# Patient Record
Sex: MALE | Race: BLACK OR AFRICAN AMERICAN | Employment: FULL TIME | URBAN - METROPOLITAN AREA
[De-identification: names, ages, dates, MRNs, and addresses within clinical notes are randomized per-mention and may not be internally consistent; named-entity substitution may affect disease eponyms.]

---

## 2023-01-09 ENCOUNTER — HOSPITAL ENCOUNTER (EMERGENCY)
Age: 33
Discharge: HOME | End: 2023-01-09
Payer: COMMERCIAL

## 2023-01-09 VITALS
BODY MASS INDEX: 31.08 KG/M2 | WEIGHT: 250 LBS | HEIGHT: 75 IN | HEART RATE: 98 BPM | TEMPERATURE: 98.8 F | RESPIRATION RATE: 18 BRPM | SYSTOLIC BLOOD PRESSURE: 179 MMHG | DIASTOLIC BLOOD PRESSURE: 106 MMHG | OXYGEN SATURATION: 99 %

## 2023-01-09 DIAGNOSIS — S51.812A LACERATION OF LEFT FOREARM, INITIAL ENCOUNTER: ICD-10-CM

## 2023-01-09 DIAGNOSIS — T07.XXXA MULTIPLE LACERATIONS: Primary | ICD-10-CM

## 2023-01-09 PROCEDURE — 90471 IMMUNIZATION ADMIN: CPT | Performed by: PHYSICIAN ASSISTANT

## 2023-01-09 PROCEDURE — 99284 EMERGENCY DEPT VISIT MOD MDM: CPT

## 2023-01-09 PROCEDURE — 12002 RPR S/N/AX/GEN/TRNK2.6-7.5CM: CPT

## 2023-01-09 PROCEDURE — 6360000002 HC RX W HCPCS: Performed by: PHYSICIAN ASSISTANT

## 2023-01-09 PROCEDURE — 90714 TD VACC NO PRESV 7 YRS+ IM: CPT | Performed by: PHYSICIAN ASSISTANT

## 2023-01-09 RX ADMIN — CLOSTRIDIUM TETANI TOXOID ANTIGEN (FORMALDEHYDE INACTIVATED) AND CORYNEBACTERIUM DIPHTHERIAE TOXOID ANTIGEN (FORMALDEHYDE INACTIVATED) 0.5 ML: 5; 2 INJECTION, SUSPENSION INTRAMUSCULAR at 15:39

## 2023-01-09 ASSESSMENT — PAIN - FUNCTIONAL ASSESSMENT: PAIN_FUNCTIONAL_ASSESSMENT: 0-10

## 2023-01-09 ASSESSMENT — LIFESTYLE VARIABLES
HOW OFTEN DO YOU HAVE A DRINK CONTAINING ALCOHOL: NEVER
HOW MANY STANDARD DRINKS CONTAINING ALCOHOL DO YOU HAVE ON A TYPICAL DAY: PATIENT DOES NOT DRINK

## 2023-01-09 ASSESSMENT — PAIN SCALES - GENERAL: PAINLEVEL_OUTOF10: 8

## 2023-01-09 ASSESSMENT — PAIN DESCRIPTION - ORIENTATION: ORIENTATION: LEFT

## 2023-01-09 ASSESSMENT — PAIN DESCRIPTION - LOCATION: LOCATION: ARM

## 2023-01-09 NOTE — DISCHARGE INSTRUCTIONS
Follow-up with primary care doctor, urgent care or here in the emergency room to have sutures removed in the next 7 to 10 days. Your tetanus vaccine was updated today. Take Tylenol and ibuprofen for pain as needed    Return to emergency room if develop any redness, swelling, fevers, chills or signs of infection.
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
## 2024-03-08 ENCOUNTER — HOSPITAL ENCOUNTER (EMERGENCY)
Age: 34
Discharge: PSYCHIATRIC OR SUBSTANCE USE FACILITY | End: 2024-03-09
Attending: STUDENT IN AN ORGANIZED HEALTH CARE EDUCATION/TRAINING PROGRAM
Payer: COMMERCIAL

## 2024-03-08 DIAGNOSIS — F15.10 METHAMPHETAMINE ABUSE (HCC): ICD-10-CM

## 2024-03-08 DIAGNOSIS — R45.851 SUICIDAL IDEATION: Primary | ICD-10-CM

## 2024-03-08 DIAGNOSIS — R44.0 AUDITORY HALLUCINATION: ICD-10-CM

## 2024-03-08 LAB
ALBUMIN SERPL-MCNC: 5.1 G/DL (ref 3.4–5)
ALBUMIN/GLOB SERPL: 1.6 {RATIO} (ref 1.1–2.2)
ALP SERPL-CCNC: 74 U/L (ref 40–129)
ALT SERPL-CCNC: 22 U/L (ref 10–40)
AMPHETAMINES UR QL SCN>1000 NG/ML: POSITIVE
ANION GAP SERPL CALCULATED.3IONS-SCNC: 15 MMOL/L (ref 3–16)
APAP SERPL-MCNC: <5 UG/ML (ref 10–30)
AST SERPL-CCNC: 33 U/L (ref 15–37)
BARBITURATES UR QL SCN>200 NG/ML: ABNORMAL
BASOPHILS # BLD: 0 K/UL (ref 0–0.2)
BASOPHILS NFR BLD: 0.6 %
BENZODIAZ UR QL SCN>200 NG/ML: ABNORMAL
BILIRUB SERPL-MCNC: 0.4 MG/DL (ref 0–1)
BUN SERPL-MCNC: 15 MG/DL (ref 7–20)
CALCIUM SERPL-MCNC: 10.1 MG/DL (ref 8.3–10.6)
CANNABINOIDS UR QL SCN>50 NG/ML: POSITIVE
CHLORIDE SERPL-SCNC: 99 MMOL/L (ref 99–110)
CO2 SERPL-SCNC: 21 MMOL/L (ref 21–32)
COCAINE UR QL SCN: ABNORMAL
CREAT SERPL-MCNC: 1.5 MG/DL (ref 0.9–1.3)
DEPRECATED RDW RBC AUTO: 13.1 % (ref 12.4–15.4)
DRUG SCREEN COMMENT UR-IMP: ABNORMAL
EOSINOPHIL # BLD: 0.1 K/UL (ref 0–0.6)
EOSINOPHIL NFR BLD: 0.8 %
ETHANOLAMINE SERPL-MCNC: NORMAL MG/DL (ref 0–0.08)
FENTANYL SCREEN, URINE: ABNORMAL
FLUAV RNA RESP QL NAA+PROBE: NOT DETECTED
FLUBV RNA RESP QL NAA+PROBE: NOT DETECTED
GFR SERPLBLD CREATININE-BSD FMLA CKD-EPI: >60 ML/MIN/{1.73_M2}
GLUCOSE SERPL-MCNC: 151 MG/DL (ref 70–99)
HCT VFR BLD AUTO: 42 % (ref 40.5–52.5)
HGB BLD-MCNC: 14.5 G/DL (ref 13.5–17.5)
LYMPHOCYTES # BLD: 1.1 K/UL (ref 1–5.1)
LYMPHOCYTES NFR BLD: 13.9 %
MAGNESIUM SERPL-MCNC: 1.9 MG/DL (ref 1.8–2.4)
MCH RBC QN AUTO: 33.8 PG (ref 26–34)
MCHC RBC AUTO-ENTMCNC: 34.6 G/DL (ref 31–36)
MCV RBC AUTO: 97.7 FL (ref 80–100)
METHADONE UR QL SCN>300 NG/ML: ABNORMAL
MONOCYTES # BLD: 0.5 K/UL (ref 0–1.3)
MONOCYTES NFR BLD: 6.3 %
NEUTROPHILS # BLD: 6.1 K/UL (ref 1.7–7.7)
NEUTROPHILS NFR BLD: 78.4 %
OPIATES UR QL SCN>300 NG/ML: ABNORMAL
OXYCODONE UR QL SCN: ABNORMAL
PCP UR QL SCN>25 NG/ML: ABNORMAL
PH UR STRIP: 5 [PH]
PLATELET # BLD AUTO: 262 K/UL (ref 135–450)
PMV BLD AUTO: 8.6 FL (ref 5–10.5)
POTASSIUM SERPL-SCNC: 3.3 MMOL/L (ref 3.5–5.1)
PROT SERPL-MCNC: 8.3 G/DL (ref 6.4–8.2)
RBC # BLD AUTO: 4.3 M/UL (ref 4.2–5.9)
SALICYLATES SERPL-MCNC: <0.3 MG/DL (ref 15–30)
SARS-COV-2 RNA RESP QL NAA+PROBE: NOT DETECTED
SODIUM SERPL-SCNC: 135 MMOL/L (ref 136–145)
WBC # BLD AUTO: 7.7 K/UL (ref 4–11)

## 2024-03-08 PROCEDURE — 80307 DRUG TEST PRSMV CHEM ANLYZR: CPT

## 2024-03-08 PROCEDURE — 93005 ELECTROCARDIOGRAM TRACING: CPT | Performed by: STUDENT IN AN ORGANIZED HEALTH CARE EDUCATION/TRAINING PROGRAM

## 2024-03-08 PROCEDURE — 99285 EMERGENCY DEPT VISIT HI MDM: CPT

## 2024-03-08 PROCEDURE — 90791 PSYCH DIAGNOSTIC EVALUATION: CPT | Performed by: SOCIAL WORKER

## 2024-03-08 PROCEDURE — 85025 COMPLETE CBC W/AUTO DIFF WBC: CPT

## 2024-03-08 PROCEDURE — 83735 ASSAY OF MAGNESIUM: CPT

## 2024-03-08 PROCEDURE — 6370000000 HC RX 637 (ALT 250 FOR IP): Performed by: STUDENT IN AN ORGANIZED HEALTH CARE EDUCATION/TRAINING PROGRAM

## 2024-03-08 PROCEDURE — 80143 DRUG ASSAY ACETAMINOPHEN: CPT

## 2024-03-08 PROCEDURE — 87636 SARSCOV2 & INF A&B AMP PRB: CPT

## 2024-03-08 PROCEDURE — 80179 DRUG ASSAY SALICYLATE: CPT

## 2024-03-08 PROCEDURE — 80053 COMPREHEN METABOLIC PANEL: CPT

## 2024-03-08 PROCEDURE — 82077 ASSAY SPEC XCP UR&BREATH IA: CPT

## 2024-03-08 RX ORDER — POTASSIUM CHLORIDE 20 MEQ/1
40 TABLET, EXTENDED RELEASE ORAL ONCE
Status: COMPLETED | OUTPATIENT
Start: 2024-03-08 | End: 2024-03-08

## 2024-03-08 RX ORDER — QUETIAPINE FUMARATE 25 MG/1
25 TABLET, FILM COATED ORAL 2 TIMES DAILY
COMMUNITY

## 2024-03-08 RX ORDER — 0.9 % SODIUM CHLORIDE 0.9 %
1000 INTRAVENOUS SOLUTION INTRAVENOUS ONCE
Status: DISCONTINUED | OUTPATIENT
Start: 2024-03-08 | End: 2024-03-09 | Stop reason: HOSPADM

## 2024-03-08 RX ORDER — BUSPIRONE HYDROCHLORIDE 5 MG/1
5 TABLET ORAL 3 TIMES DAILY
COMMUNITY

## 2024-03-08 RX ADMIN — POTASSIUM CHLORIDE 40 MEQ: 1500 TABLET, EXTENDED RELEASE ORAL at 18:10

## 2024-03-08 ASSESSMENT — PAIN - FUNCTIONAL ASSESSMENT: PAIN_FUNCTIONAL_ASSESSMENT: NONE - DENIES PAIN

## 2024-03-08 ASSESSMENT — PAIN SCALES - GENERAL: PAINLEVEL_OUTOF10: 0

## 2024-03-08 NOTE — ED PROVIDER NOTES
ED Attending Attestation Note    This patient was seen by the advanced practice provider.     I personally saw the patient and made/approved the management plan and take responsibility for the patient management.    History:   Briefly, 33 y.o. male presents with suicide ideation has been worse as well as worsen depression with thoughts of harming self.  Also has been having auditory hallucinations with voices telling him to kill himself as well as to kill others      Physical Exam  Vitals and nursing note reviewed.   Constitutional:       General: He is not in acute distress.     Appearance: He is not ill-appearing or toxic-appearing.   HENT:      Head: Normocephalic and atraumatic.      Right Ear: External ear normal.      Left Ear: External ear normal.      Nose: Nose normal.   Eyes:      Conjunctiva/sclera: Conjunctivae normal.   Cardiovascular:      Rate and Rhythm: Regular rhythm. Tachycardia present.      Pulses: Normal pulses.      Heart sounds: Normal heart sounds. No murmur heard.     No friction rub. No gallop.      Comments: Radial pulses 2+ bilaterally, DP pulses 2+ bilaterally  Pulmonary:      Effort: Pulmonary effort is normal. No respiratory distress.      Breath sounds: No wheezing or rales.   Musculoskeletal:      Right lower leg: No edema.      Left lower leg: No edema.   Skin:     General: Skin is warm and dry.      Capillary Refill: Capillary refill takes less than 2 seconds.   Neurological:      Mental Status: He is alert.   Psychiatric:         Attention and Perception: He perceives auditory hallucinations.         Behavior: Behavior is not aggressive.         Thought Content: Thought content is paranoid. Thought content includes suicidal ideation. Thought content does not include homicidal ideation. Thought content includes suicidal plan. Thought content does not include homicidal plan.           ED Course as of 03/08/24 2339   Fri Mar 08, 2024   2197 Spoke with Camille from [email or web address] who is  recommending inpatient psych treatment at this time due to SI with plan and hallucinations.  [PE]      ED Course User Index  [PE] Victor Manuel Lopez PA           EKG interpreted by myself.   Rate: 94  Rhythm: Sinus rhythm  Axis: normal  Intervals: KS interval 120, QRS 94, QTc 437  ST Segments: No clinically significant ST segment elevation or depression, benign early repolarization in V2 V3  T waves: No significant T wave abnormality.  Comparison: No prior for comparison  Impression: Sinus with benign repolarization V2 V3        MDM:   This is a 33-year-old male coming with suicide ideation as well as methamphetamine abuse with hallucination of voices time to as well as kill others.  Mild hypokalemia which was repleted, mild MEREDITH he was given fluids for rehydration.  No other significant metabolic electrolyte derangements, no leukocytosis no anemia or thrombocytopenia, ethanol negative, acetaminophen level, magnesium level within normal limits, salicylate level negative.  Patient is medically cleared at this time for inpatient psychiatric admission.       For further details of the patient's emergency department visit, please see the advanced practice provider's documentation.    Brady Oden MD     This report has been produced using speech recognition software and may contain errors related to that system including errors in grammar, punctuation, and spelling, as well as words and phrases that may be inappropriate. If there are any questions or concerns please feel free to contact the dictating provider for clarification.          Brady Oden MD  03/08/24 6672

## 2024-03-08 NOTE — ED NOTES
Pt placed in a gown with strings removed, all belongings removed, Iftikhar from security inventoried and took belongings to security office.,   Room is safe for patient, all monitor wires removed, all other cords removed except call light.  Call light given to patient .  Door remains open for safety of patient and .

## 2024-03-08 NOTE — ED PROVIDER NOTES
[facility] EMERGENCY DEPARTMENT  EMERGENCY DEPARTMENT ENCOUNTER        Pt Name: Jerzy Galeana  MRN: [id number]  Birthdate 1990  Date of evaluation: 3/8/2024  Provider: IRVIN Rangel  PCP: No primary care provider on file.  Note Started: 4:37 PM EST 3/8/24       I have seen and evaluated this patient with my supervising physician Brady Oden, *.      CHIEF COMPLAINT       Chief Complaint   Patient presents with    Suicidal     States \"I want to kill myself and other people\" also states he wants to detox from meth, last use was this morning around 4-5AM. States he is looking for rehab to help him. \"If I keep doing drugs..\" states previous suicidal attempts by swallowing pills          HISTORY OF PRESENT ILLNESS: 1 or more Elements     History from : Patient    Limitations to history : None    Jerzy Galeana is a 33 y.o. male who presents to the emergency room due to suicidal and homicidal ideations present for the last month or so.  States that he relapsed over the last couple months doing meth.  Patient states that he lives at home alone and does not have any family members around.  Patient states that he has had a episode of suicidal attempt by taking a bunch of pills in the past.  States that he had to have his stomach pumped because of this.  Patient states that he would either take some pills or jump off a bridge to try to kill himself.  He also keeps saying that he hears voices from time to time.  He does not state that these voices tell him to hurt himself or hurt others.    Nursing Notes were all reviewed and agreed with or any disagreements were addressed in the HPI.    REVIEW OF SYSTEMS :      Review of Systems   Psychiatric/Behavioral:  Positive for self-injury and suicidal ideas.        Positives and Pertinent negatives as per HPI.     SURGICAL HISTORY   History reviewed. No pertinent surgical history.    CURRENTMEDICATIONS       Previous Medications    BUSPIRONE  158/72 (!) 158/86 (!) 158/86   Pulse: (!) 118  99   Resp: 19     Temp: 97.7 °F (36.5 °C)     TempSrc: Oral     SpO2: 100%  100%   Weight: 121.6 kg (268 lb 1.6 oz)         Patient was given the following medications:  Medications   sodium chloride 0.9 % bolus 1,000 mL (1,000 mLs IntraVENous Not Given 3/8/24 1816)   potassium chloride (KLOR-CON M) extended release tablet 40 mEq (40 mEq Oral Given 3/8/24 1810)       ED Course as of 03/08/24 2102   Fri Mar 08, 2024   1707 Spoke with Camille from [facility] who is recommending inpatient psych treatment at this time due to SI with plan and hallucinations.  [PE]      ED Course User Index  [PE] Victor Manuel Lopez PA        Is this patient to be included in the SEP-1 Core Measure due to severe sepsis or septic shock?   No   Exclusion criteria - the patient is NOT to be included for SEP-1 Core Measure due to:  Infection is not suspected    CONSULTS: (Who and What was discussed)  IP CONSULT TO TELE-PSYCH (SOCIAL WORK ONLY)  Discussion with Other Profesionals : None    Social Determinants : None    Records Reviewed : None    CC/HPI Summary, DDx, ED Course, and Reassessment: Briefly is a 33-year-old male who presents emergency room due to suicidal ideations as well as auditory hallucinations.  States that he has been on meth recently.  States he lives alone.  He states that he has plans of hurting himself by either jumping off of a bridge or taking much prescription medications.  He has no other acute medical complaints.    On exam he does not appear to be in any respiratory distress.  He does appear somewhat flat and only provide short answers.  Lungs are clear to auscultation heart has regular rate and rhythm.     Screening protocol was ordered due to his suicidal ideations and hallucinations.    Workup today with normal CBC negative for alcohol negative for acetaminophen level salicylate level.  CMP showing sodium 135 potassium 3.3 glucose 151 creatinine of 1.5.    COVID-19 and

## 2024-03-08 NOTE — VIRTUAL HEALTH
Jerzy Galeana  [id number]  1990     Social Work Behavioral Health Crisis Assessment    03/08/24    Chief Complaint: \" I want to jump off a bridge and kill myself\"    HPI: Patient is a 33 y.o. Black /  male who presents for SI. Patient presented to the ED on 03/08/24 from home.    Past Psychiatric History:  Previous Diagnoses/symptoms: Depression and anxiety   Previous suicide attempts/self-harm: patient reported that he tired to kill himself in the past   Inpatient psychiatric hospitalizations: no  Current outpatient psychiatric provider: Denies  Current therapist: States not in therapy  Previous psychiatric medication trials: No prior medication trials  Current psychiatric medications: hasn't been taking meds   Family Psychiatric History: Denies    Sleep Hours: 3-4 hours    Sleep concerns: difficulty attaining sleep    Use of sleep medications: denies    Substance Abuse History:  Tobacco: Denies  Alcohol: Denies  Marijuana: Endorses reported last use today  Stimulant: Endorses Patient history of methamphetamine last used today, and history using crack cocaine,    Opiates: Denies  Benzodiazepine: Denies  Other illicit drug usage: Denies  History of substance/alcohol abuse treatment: Denies    Social History:  Education: H.S.  Living Situation/Interest: alone  Marital/Committed relationship and parenting hx: single  Occupation: Unemployed  Legal History/Hx of Violence: Denies  Spiritual History: Worship   Psychological trauma, neglect, or abuse: denies hx of trauma/abuse   Access to guns or other weapons: denies having access to firearms/dangerous weapons     Past Medical History:  Active Ambulatory Problems     Diagnosis Date Noted    No Active Ambulatory Problems     Resolved Ambulatory Problems     Diagnosis Date Noted    No Resolved Ambulatory Problems     Past Medical History:   Diagnosis Date    Seizures (HCC)      Allergies:  No Known Allergies   Medications:  No current

## 2024-03-09 VITALS
TEMPERATURE: 97.7 F | BODY MASS INDEX: 33.33 KG/M2 | HEART RATE: 84 BPM | WEIGHT: 268.08 LBS | OXYGEN SATURATION: 100 % | HEIGHT: 75 IN | DIASTOLIC BLOOD PRESSURE: 82 MMHG | SYSTOLIC BLOOD PRESSURE: 140 MMHG | RESPIRATION RATE: 19 BRPM

## 2024-03-09 LAB
EKG ATRIAL RATE: 94 BPM
EKG DIAGNOSIS: NORMAL
EKG P AXIS: 69 DEGREES
EKG P-R INTERVAL: 120 MS
EKG Q-T INTERVAL: 350 MS
EKG QRS DURATION: 94 MS
EKG QTC CALCULATION (BAZETT): 437 MS
EKG R AXIS: 46 DEGREES
EKG T AXIS: 25 DEGREES
EKG VENTRICULAR RATE: 94 BPM

## 2024-03-09 PROCEDURE — 93010 ELECTROCARDIOGRAM REPORT: CPT | Performed by: INTERNAL MEDICINE

## 2024-03-09 ASSESSMENT — PAIN SCALES - GENERAL: PAINLEVEL_OUTOF10: 0

## 2024-03-09 NOTE — ED NOTES
This RN has assumed care of pt. Report received from Sosa HAWTHORNE. Sitter remains in place. Room door open. Room remains safe. Belongings remain removed from room.

## 2024-03-09 NOTE — ED NOTES
Pt resting in bed with eyes closed. Chest rising equally with respirations. Room remains safe. Sitter remains in place. Belongings remain outside of room. Pt expresses no further needs at this time.

## 2024-03-09 NOTE — ED NOTES
Pt resting in bed with eyes open. Room remains safe. Sitter remains in place. Belongings remain outside of room. Pt expresses no further needs at this time.

## 2024-03-09 NOTE — ED NOTES
Labs scheduled on 4/24/23.   Pt resting in bed with eyes open. Room remains safe. Sitter remains in place. Belongings remain outside of room. Pt expresses no further needs at this time.

## 2024-03-09 NOTE — ED NOTES
Pt sleeping in bed peacefully. Chest rising equally with respirations. Room remains safe. Sitter remains in place. Belongings remain outside of room.

## 2024-04-08 ENCOUNTER — HOSPITAL ENCOUNTER (EMERGENCY)
Age: 34
Discharge: PSYCHIATRIC OR SUBSTANCE USE FACILITY | End: 2024-04-09
Attending: EMERGENCY MEDICINE
Payer: COMMERCIAL

## 2024-04-08 DIAGNOSIS — T07.XXXA MULTIPLE LACERATIONS: ICD-10-CM
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
## 2024-04-08 LAB
ACETAMINOPHEN LEVEL: <5 UG/ML (ref 15–30)
ALBUMIN SERPL-MCNC: 4.5 GM/DL (ref 3.4–5)
ALCOHOL SCREEN SERUM: <0.01 %WT/VOL
ALP BLD-CCNC: 81 IU/L (ref 40–128)
ALT SERPL-CCNC: 30 U/L (ref 10–40)
AMPHETAMINES: NEGATIVE
ANION GAP SERPL CALCULATED.3IONS-SCNC: 11 MMOL/L (ref 7–16)
AST SERPL-CCNC: 22 IU/L (ref 15–37)
BARBITURATE SCREEN URINE: NEGATIVE
BASOPHILS ABSOLUTE: 0.1 K/CU MM
BASOPHILS RELATIVE PERCENT: 0.9 % (ref 0–1)
BENZODIAZEPINE SCREEN, URINE: NEGATIVE
BILIRUB SERPL-MCNC: 0.2 MG/DL (ref 0–1)
BUN SERPL-MCNC: 9 MG/DL (ref 6–23)
CALCIUM SERPL-MCNC: 9.1 MG/DL (ref 8.3–10.6)
CANNABINOID SCREEN URINE: ABNORMAL
CHLORIDE BLD-SCNC: 103 MMOL/L (ref 99–110)
CO2: 26 MMOL/L (ref 21–32)
COCAINE METABOLITE: NEGATIVE
CREAT SERPL-MCNC: 1.1 MG/DL (ref 0.9–1.3)
DIFFERENTIAL TYPE: ABNORMAL
DOSE AMOUNT: ABNORMAL
DOSE AMOUNT: ABNORMAL
DOSE TIME: ABNORMAL
DOSE TIME: ABNORMAL
EOSINOPHILS ABSOLUTE: 0.1 K/CU MM
EOSINOPHILS RELATIVE PERCENT: 1.3 % (ref 0–3)
FENTANYL URINE: NEGATIVE
GFR SERPL CREATININE-BSD FRML MDRD: >90 ML/MIN/1.73M2
GLUCOSE SERPL-MCNC: 155 MG/DL (ref 70–99)
HCT VFR BLD CALC: 40.5 % (ref 42–52)
HEMOGLOBIN: 13.1 GM/DL (ref 13.5–18)
IMMATURE NEUTROPHIL %: 0.4 % (ref 0–0.43)
LYMPHOCYTES ABSOLUTE: 1.8 K/CU MM
LYMPHOCYTES RELATIVE PERCENT: 32.5 % (ref 24–44)
MCH RBC QN AUTO: 32.8 PG (ref 27–31)
MCHC RBC AUTO-ENTMCNC: 32.3 % (ref 32–36)
MCV RBC AUTO: 101.5 FL (ref 78–100)
MONOCYTES ABSOLUTE: 0.4 K/CU MM
MONOCYTES RELATIVE PERCENT: 7.7 % (ref 0–4)
NUCLEATED RBC %: 0 %
OPIATES, URINE: NEGATIVE
OXYCODONE: NEGATIVE
PDW BLD-RTO: 11.9 % (ref 11.7–14.9)
PLATELET # BLD: 323 K/CU MM (ref 140–440)
PMV BLD AUTO: 10.2 FL (ref 7.5–11.1)
POTASSIUM SERPL-SCNC: 4.4 MMOL/L (ref 3.5–5.1)
RBC # BLD: 3.99 M/CU MM (ref 4.6–6.2)
SALICYLATE LEVEL: <0.3 MG/DL (ref 15–30)
SEGMENTED NEUTROPHILS ABSOLUTE COUNT: 3.1 K/CU MM
SEGMENTED NEUTROPHILS RELATIVE PERCENT: 57.2 % (ref 36–66)
SODIUM BLD-SCNC: 140 MMOL/L (ref 135–145)
TOTAL IMMATURE NEUTOROPHIL: 0.02 K/CU MM
TOTAL NUCLEATED RBC: 0 K/CU MM
TOTAL PROTEIN: 7.3 GM/DL (ref 6.4–8.2)
WBC # BLD: 5.4 K/CU MM (ref 4–10.5)

## 2024-04-08 PROCEDURE — 83036 HEMOGLOBIN GLYCOSYLATED A1C: CPT

## 2024-04-08 PROCEDURE — G0480 DRUG TEST DEF 1-7 CLASSES: HCPCS

## 2024-04-08 PROCEDURE — 80307 DRUG TEST PRSMV CHEM ANLYZR: CPT

## 2024-04-08 PROCEDURE — 6360000002 HC RX W HCPCS: Performed by: EMERGENCY MEDICINE

## 2024-04-08 PROCEDURE — 90715 TDAP VACCINE 7 YRS/> IM: CPT | Performed by: EMERGENCY MEDICINE

## 2024-04-08 PROCEDURE — 85025 COMPLETE CBC W/AUTO DIFF WBC: CPT

## 2024-04-08 PROCEDURE — 90471 IMMUNIZATION ADMIN: CPT | Performed by: EMERGENCY MEDICINE

## 2024-04-08 PROCEDURE — 12002 RPR S/N/AX/GEN/TRNK2.6-7.5CM: CPT

## 2024-04-08 PROCEDURE — 90791 PSYCH DIAGNOSTIC EVALUATION: CPT | Performed by: SOCIAL WORKER

## 2024-04-08 PROCEDURE — 80053 COMPREHEN METABOLIC PANEL: CPT

## 2024-04-08 PROCEDURE — 2500000003 HC RX 250 WO HCPCS: Performed by: EMERGENCY MEDICINE

## 2024-04-08 PROCEDURE — 99285 EMERGENCY DEPT VISIT HI MDM: CPT

## 2024-04-08 PROCEDURE — 96372 THER/PROPH/DIAG INJ SC/IM: CPT

## 2024-04-08 RX ORDER — LIDOCAINE HYDROCHLORIDE 10 MG/ML
20 INJECTION, SOLUTION INFILTRATION; PERINEURAL ONCE
Status: COMPLETED | OUTPATIENT
Start: 2024-04-08 | End: 2024-04-08

## 2024-04-08 RX ORDER — HALOPERIDOL 5 MG/ML
5 INJECTION INTRAMUSCULAR ONCE
Status: COMPLETED | OUTPATIENT
Start: 2024-04-08 | End: 2024-04-08

## 2024-04-08 RX ORDER — DIPHENHYDRAMINE HYDROCHLORIDE 50 MG/ML
25 INJECTION INTRAMUSCULAR; INTRAVENOUS ONCE
Status: DISCONTINUED | OUTPATIENT
Start: 2024-04-08 | End: 2024-04-08

## 2024-04-08 RX ORDER — LORAZEPAM 2 MG/ML
2 INJECTION INTRAMUSCULAR ONCE
Status: DISCONTINUED | OUTPATIENT
Start: 2024-04-08 | End: 2024-04-08

## 2024-04-08 RX ORDER — LORAZEPAM 2 MG/ML
2 INJECTION INTRAMUSCULAR ONCE
Status: COMPLETED | OUTPATIENT
Start: 2024-04-08 | End: 2024-04-08

## 2024-04-08 RX ORDER — DIPHENHYDRAMINE HYDROCHLORIDE 50 MG/ML
25 INJECTION INTRAMUSCULAR; INTRAVENOUS ONCE
Status: COMPLETED | OUTPATIENT
Start: 2024-04-08 | End: 2024-04-08

## 2024-04-08 RX ADMIN — LORAZEPAM 2 MG: 2 INJECTION INTRAMUSCULAR; INTRAVENOUS at 17:00

## 2024-04-08 RX ADMIN — LIDOCAINE HYDROCHLORIDE 20 ML: 10 INJECTION, SOLUTION INFILTRATION; PERINEURAL at 18:29

## 2024-04-08 RX ADMIN — HALOPERIDOL LACTATE 5 MG: 5 INJECTION, SOLUTION INTRAMUSCULAR at 17:00

## 2024-04-08 RX ADMIN — TETANUS TOXOID, REDUCED DIPHTHERIA TOXOID AND ACELLULAR PERTUSSIS VACCINE, ADSORBED 0.5 ML: 5; 2.5; 8; 8; 2.5 SUSPENSION INTRAMUSCULAR at 17:42

## 2024-04-08 RX ADMIN — DIPHENHYDRAMINE HYDROCHLORIDE 25 MG: 50 INJECTION, SOLUTION INTRAMUSCULAR; INTRAVENOUS at 18:18
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
## 2024-04-08 NOTE — ED PROVIDER NOTES
Emergency Department Encounter    Patient: Jerzy Galeana  MRN: [id number]  : 1990  Date of Evaluation: 2024  ED Provider:  Nash West MD    Triage Chief Complaint:   Suicide Attempt (Several lacerations to bilateral wrists and forearms. Barricaded self in room at [facility] and attempted suicide, door needed to be broke down to get to patient. Bleeding controlled on arrival but patient refuses to talk, just continuously states \"I'm fine\".)    [place]:  Jerzy Galeana is a 33 y.o. male history of major depressive disorder as well as cannabis use that presents emergency department with the police department from the local chemical dependency treatment center, [facility], concerning her multiple self-inflicted lacerations in both forearms.  It is reported that at the facility patients were taken out outside received a collapsed but patient barricaded himself in the room and was somehow able to obtain a razor blade and cut himself with it.  He stated the door had to be broken to get him out of there.  However, patient is currently denying having suicidal or homicidal thoughts.  When I ask him why he did this he replies \"I do not know.\"  He is not making any eye contact and looking down all the time.  He denies any illicit or drug use in the recent past    ROS - see HPI, below listed is current ROS at time of my eval:  General:  No fevers, no chills, no weakness  Eyes:  No recent vison changes, no discharge  ENT:  No sore throat, no nasal congestion, no hearing changes  Cardiovascular:  No chest pain, no palpitations  Respiratory:  No shortness of breath, no cough, no wheezing  Gastrointestinal:  No pain, no nausea, no vomiting, no diarrhea  Musculoskeletal:  No muscle pain, no joint pain  Skin:  No rash, no pruritis, no easy bruising  Neurologic:  No speech problems, no headache, no extremity numbness, no extremity tingling, no extremity weakness  Psychiatric: Presumed suicidal attempt with multiple

## 2024-04-08 NOTE — VIRTUAL HEALTH
HPI: Patient is a 33 y.o. Black /  male who presents for suicide attempted by cutting both wrists. Patient presented to the ED on 04/08/24 from [facility].     Recorded, Pt brought in by EMS and SPD, pink slipped. Pt from [facility]. Per EMS, pt barricaded himself in his room at [facility] and broke open shaving razor and slit both of his wrists. Pt is unwilling to say what happened and is answering this RN's questions all with \"no\".     YASIRW discussed consult with  staff onsite, Gissell.   Reported that at this time pt is not will to cooperate and is getting stitched. If pt still is unwilling to participate will have to send in new order for assessment to be completed after 6:30pm.    At this time order will be cancelled and re-consulted when needed.    Reason for Cancel: TelePsych Reason for Cancel:  staff onsite       Total time spent on this encounter: Not billed by time    --Lilly Brannon LCSW on 4/8/2024 at 4:58 PM    An electronic signature was used to authenticate this note.

## 2024-04-08 NOTE — ED NOTES
Patient continues to deny SI at this time. States \"I'm fine\". Patient does report hx of meth abuse and states that he has been clean for 30 days. This nurse asked patient if he has been hearing voices and patient continues to state, \"no\". Also states \"no\" to HI.

## 2024-04-08 NOTE — ED NOTES
Rani ARCINIEGA, called and spoke with this nurse and states that patient barricaded himself in room and pushed bed in front of door and door had to be broken down in order to get to patient. DEMIAN states that patient was yelling that he didn't want to live anymore and that he just wanted to die.

## 2024-04-08 NOTE — ED NOTES
Transfer Center Checklist for Behavioral Health Transfers      Currently in Restraints Now or During this Encounter: No  (Specify if Agitation or self harm is noted in ED?)            Medical Clearance Documented and Verified in the Chart: Yes    LABS  Labs Resulted (see below, if applicable): Yes  Are Any of the Labs Abnormal: Yes: Comment: HGB RBC Hematocrit     CBC:   Lab Results   Component Value Date/Time    WBC 5.4 04/08/2024 06:35 PM    RBC 3.99 04/08/2024 06:35 PM    HGB 13.1 04/08/2024 06:35 PM    HCT 40.5 04/08/2024 06:35 PM    .5 04/08/2024 06:35 PM    MCH 32.8 04/08/2024 06:35 PM    MCHC 32.3 04/08/2024 06:35 PM    RDW 11.9 04/08/2024 06:35 PM     04/08/2024 06:35 PM    MPV 10.2 04/08/2024 06:35 PM     CMP:   Lab Results   Component Value Date/Time     04/08/2024 06:35 PM    K 4.4 04/08/2024 06:35 PM    K 3.3 03/08/2024 04:30 PM     04/08/2024 06:35 PM    CO2 26 04/08/2024 06:35 PM    BUN 9 04/08/2024 06:35 PM    CREATININE 1.1 04/08/2024 06:35 PM    AGRATIO 1.6 03/08/2024 04:30 PM    LABGLOM >90 04/08/2024 06:35 PM    GLUCOSE 155 04/08/2024 06:35 PM    PROT 7.3 04/08/2024 06:35 PM    LABALBU 4.5 04/08/2024 06:35 PM    CALCIUM 9.1 04/08/2024 06:35 PM    BILITOT 0.2 04/08/2024 06:35 PM    ALKPHOS 81 04/08/2024 06:35 PM    AST 22 04/08/2024 06:35 PM    ALT 30 04/08/2024 06:35 PM     Drug Panel: No results found for: \"AMPHETAMUR\", \"BARBITURATUR\", \"BENZODIAZURI\", \"COCAINEUR\", \"METHADU\", \"OPIAU\", \"PCPURINE\", \"THCUR\", \"LABCOMM\"  UA:No results found for: \"COLORU\", \"APPEARANCE\", \"LABPH\", \"PROTEINU\", \"GLUCOSEU\", \"KETUA\", \"BILIRUBINUR\", \"BLOODU\", \"UROBILINOGEN\", \"NITRU\", \"LEUKOCYTESUR\", \"WBCUA\", \"RBCUA\", \"EPITHUA\", \"BACTERIA\"  PREGNANCY TEST: No results found for: \"PREGTESTUR\"    Patient's Current Location: [facility] EMERGENCY DEPARTMENT     Chief Complaint   Patient presents with    Suicide Attempt     Several lacerations to bilateral wrists and

## 2024-04-08 NOTE — VIRTUAL HEALTH
Jerzy Galeana  [id number]  1990     Social Work Behavioral Health Crisis Assessment    04/08/24    Chief Complaint: Suicide attempt    HPI: Patient is a 33 y.o. Black /  male who presents for Suicide attempt. Patient presented to the ED on 04/08/24 from [facility].    Recorded, Pt brought in by EMS and SPD, pink slipped. Pt from [facility]. Per EMS, pt barricaded himself in his room at [facility] and broke open shaving razor and slit both of his wrists. Pt is unwilling to say what happened and is answering this RN's questions all with \"no\".      Pt reported, \"nothing really to talk about. Just depressed, just did it. I was in my bed thinking about stuff, and I just deserved to do that and be here. So I pushed my bed in front of the door, wrote my mom a letter, and went to the bathroom and I did it\".    Past Psychiatric History:  Previous Diagnoses/symptoms: anxiety and depression.  Previous suicide attempts/self-harm: History of suicide attempt and history of self-harm.   Inpatient psychiatric hospitalizations: yes \"history reports it was cancelled\"  Current outpatient psychiatric provider: [facility]  Current therapist: Unsure  Previous psychiatric medication trials: \"Yes different ones for different things\"  Current psychiatric medications: Yes  Family Psychiatric History: Denies    Sleep Hours: 2 hours    Sleep concerns:  \"it has been rough, like I take my medications, and I can't sleep. I will lucid dream, and just wake up exhausted\".     Use of sleep medications:  \"I take trazodone, and nothing has been helping\"    Substance Abuse History:  Tobacco: Denies  Alcohol: Denies  Marijuana: Denies  Stimulant:  Denies  Opiates: Denies  Benzodiazepine: Denies  Other illicit drug usage:  \"Meth last month on the 7th\"  History of substance/alcohol abuse treatment: Yes    Social History:  Education: H.S.  Living Situation/Interest: alone  Marital/Committed relationship and

## 2024-04-09 VITALS
RESPIRATION RATE: 16 BRPM | BODY MASS INDEX: 33.07 KG/M2 | SYSTOLIC BLOOD PRESSURE: 158 MMHG | HEIGHT: 75 IN | DIASTOLIC BLOOD PRESSURE: 87 MMHG | HEART RATE: 97 BPM | TEMPERATURE: 99.2 F | OXYGEN SATURATION: 100 % | WEIGHT: 266 LBS

## 2024-04-10 LAB
ESTIMATED AVERAGE GLUCOSE: 88 MG/DL
HBA1C MFR BLD: 4.7 % (ref 4.2–6.3)

## 2024-04-15 ENCOUNTER — CLINICAL DOCUMENTATION (OUTPATIENT)
Dept: INTERNAL MEDICINE CLINIC | Age: 34
End: 2024-04-15

## 2024-04-26 ENCOUNTER — HOSPITAL ENCOUNTER (EMERGENCY)
Age: 34
Discharge: PSYCHIATRIC OR SUBSTANCE USE FACILITY | End: 2024-04-26
Attending: EMERGENCY MEDICINE
Payer: COMMERCIAL

## 2024-04-26 VITALS
TEMPERATURE: 98.3 F | RESPIRATION RATE: 15 BRPM | HEART RATE: 105 BPM | DIASTOLIC BLOOD PRESSURE: 76 MMHG | SYSTOLIC BLOOD PRESSURE: 130 MMHG | OXYGEN SATURATION: 99 %

## 2024-04-26 DIAGNOSIS — F32.A DEPRESSION WITH SUICIDAL IDEATION: Primary | ICD-10-CM

## 2024-04-26 DIAGNOSIS — F19.10 POLYSUBSTANCE ABUSE (HCC): ICD-10-CM

## 2024-04-26 DIAGNOSIS — R45.851 DEPRESSION WITH SUICIDAL IDEATION: Primary | ICD-10-CM

## 2024-04-26 LAB
ACETAMINOPHEN LEVEL: <5 UG/ML (ref 15–30)
ALBUMIN SERPL-MCNC: 4.3 GM/DL (ref 3.4–5)
ALCOHOL SCREEN SERUM: <0.01 %WT/VOL
ALP BLD-CCNC: 66 IU/L (ref 40–128)
ALT SERPL-CCNC: 22 U/L (ref 10–40)
AMPHETAMINES: ABNORMAL
ANION GAP SERPL CALCULATED.3IONS-SCNC: 13 MMOL/L (ref 7–16)
AST SERPL-CCNC: 17 IU/L (ref 15–37)
BARBITURATE SCREEN URINE: NEGATIVE
BASOPHILS ABSOLUTE: 0.1 K/CU MM
BASOPHILS RELATIVE PERCENT: 1.3 % (ref 0–1)
BENZODIAZEPINE SCREEN, URINE: NEGATIVE
BILIRUB SERPL-MCNC: 0.2 MG/DL (ref 0–1)
BUN SERPL-MCNC: 16 MG/DL (ref 6–23)
CALCIUM SERPL-MCNC: 9.1 MG/DL (ref 8.3–10.6)
CANNABINOID SCREEN URINE: ABNORMAL
CHLORIDE BLD-SCNC: 101 MMOL/L (ref 99–110)
CO2: 22 MMOL/L (ref 21–32)
COCAINE METABOLITE: NEGATIVE
CREAT SERPL-MCNC: 1 MG/DL (ref 0.9–1.3)
DIFFERENTIAL TYPE: ABNORMAL
DOSE AMOUNT: ABNORMAL
DOSE AMOUNT: ABNORMAL
DOSE TIME: ABNORMAL
DOSE TIME: ABNORMAL
EOSINOPHILS ABSOLUTE: 0.2 K/CU MM
EOSINOPHILS RELATIVE PERCENT: 3 % (ref 0–3)
FENTANYL URINE: NEGATIVE
GFR SERPL CREATININE-BSD FRML MDRD: >90 ML/MIN/1.73M2
GLUCOSE SERPL-MCNC: 144 MG/DL (ref 70–99)
HCT VFR BLD CALC: 39.3 % (ref 42–52)
HEMOGLOBIN: 13.3 GM/DL (ref 13.5–18)
IMMATURE NEUTROPHIL %: 0.2 % (ref 0–0.43)
LYMPHOCYTES ABSOLUTE: 2.1 K/CU MM
LYMPHOCYTES RELATIVE PERCENT: 38.8 % (ref 24–44)
MCH RBC QN AUTO: 33.1 PG (ref 27–31)
MCHC RBC AUTO-ENTMCNC: 33.8 % (ref 32–36)
MCV RBC AUTO: 97.8 FL (ref 78–100)
MONOCYTES ABSOLUTE: 0.6 K/CU MM
MONOCYTES RELATIVE PERCENT: 10.5 % (ref 0–4)
NEUTROPHILS RELATIVE PERCENT: 46.2 % (ref 36–66)
NUCLEATED RBC %: 0 %
OPIATES, URINE: NEGATIVE
OXYCODONE: NEGATIVE
PDW BLD-RTO: 11.9 % (ref 11.7–14.9)
PLATELET # BLD: 271 K/CU MM (ref 140–440)
PMV BLD AUTO: 9.9 FL (ref 7.5–11.1)
POTASSIUM SERPL-SCNC: 4 MMOL/L (ref 3.5–5.1)
RBC # BLD: 4.02 M/CU MM (ref 4.6–6.2)
SALICYLATE LEVEL: <0.3 MG/DL (ref 15–30)
SEGMENTED NEUTROPHILS ABSOLUTE COUNT: 2.5 K/CU MM
SODIUM BLD-SCNC: 136 MMOL/L (ref 135–145)
TOTAL IMMATURE NEUTOROPHIL: 0.01 K/CU MM
TOTAL NUCLEATED RBC: 0 K/CU MM
TOTAL PROTEIN: 7.7 GM/DL (ref 6.4–8.2)
WBC # BLD: 5.4 K/CU MM (ref 4–10.5)

## 2024-04-26 PROCEDURE — 80307 DRUG TEST PRSMV CHEM ANLYZR: CPT

## 2024-04-26 PROCEDURE — G0480 DRUG TEST DEF 1-7 CLASSES: HCPCS

## 2024-04-26 PROCEDURE — 99285 EMERGENCY DEPT VISIT HI MDM: CPT

## 2024-04-26 PROCEDURE — 6370000000 HC RX 637 (ALT 250 FOR IP): Performed by: NURSE PRACTITIONER

## 2024-04-26 PROCEDURE — 85025 COMPLETE CBC W/AUTO DIFF WBC: CPT

## 2024-04-26 PROCEDURE — 6370000000 HC RX 637 (ALT 250 FOR IP): Performed by: EMERGENCY MEDICINE

## 2024-04-26 PROCEDURE — 80053 COMPREHEN METABOLIC PANEL: CPT

## 2024-04-26 RX ORDER — HYDROXYZINE PAMOATE 25 MG/1
50 CAPSULE ORAL ONCE
Status: COMPLETED | OUTPATIENT
Start: 2024-04-26 | End: 2024-04-26

## 2024-04-26 RX ORDER — BUSPIRONE HYDROCHLORIDE 5 MG/1
5 TABLET ORAL ONCE
Status: COMPLETED | OUTPATIENT
Start: 2024-04-26 | End: 2024-04-26

## 2024-04-26 RX ORDER — QUETIAPINE FUMARATE 25 MG/1
25 TABLET, FILM COATED ORAL ONCE
Status: COMPLETED | OUTPATIENT
Start: 2024-04-26 | End: 2024-04-26

## 2024-04-26 RX ADMIN — HYDROXYZINE PAMOATE 50 MG: 25 CAPSULE ORAL at 17:26

## 2024-04-26 RX ADMIN — QUETIAPINE FUMARATE 25 MG: 25 TABLET ORAL at 20:12

## 2024-04-26 RX ADMIN — BUSPIRONE HYDROCHLORIDE 5 MG: 5 TABLET ORAL at 17:41

## 2024-04-26 ASSESSMENT — ENCOUNTER SYMPTOMS: CONSTIPATION: 0

## 2024-04-26 NOTE — ED NOTES
Referral faxed to  [facility] for consideration for placement.

## 2024-04-26 NOTE — ED PROVIDER NOTES
Patient seen in collaboration with Ruthy Duke CNP.  I discussed the case with this CNP and agree with her plan of care.  I also had an independent face-to-face examination of the patient.  Patient is in chemical dependency treatment center, [facility] for meth use over a week now.  He is coming in because he is having feeling of being depression and suicidal thoughts without a plan.  He denies homicidal thoughts or plan.  He denies illicit drugs or alcohol ingestion.  Very cooperative and pleasant.  Will have social work evaluate the patient.    Labs: Unremarkable with undetectable.  Acetaminophen and salicylate levels are also undetectable.  CBC and basic chemistry lab results are within normal limits.  Urine drug screen is positive for cannabis and amphetamines.  Patient is currently awaiting 's evaluation and recommendations.     Nash West MD  04/26/24 1590

## 2024-04-26 NOTE — ED NOTES
Spoke with Enrique at  [facility]. They are waiting on orders from provider and will call once they are received.

## 2024-04-26 NOTE — ED PROVIDER NOTES
[facility] EMERGENCY DEPARTMENT  EMERGENCY DEPARTMENT ENCOUNTER      Pt Name: Jerzy Galeana  MRN: [id number]  Birthdate 1990  Date of evaluation: 4/26/2024  Provider: ANNIKA Miramontes - CNP  PCP: No primary care provider on file.  Note Started: 10:37 AM EDT 4/26/24    I am the Primary Clinician of Record.   I have seen and evaluated this patient with my supervising physician Nash West MD.  CHIEF COMPLAINT       Chief Complaint   Patient presents with    Suicidal       HISTORY OF PRESENT ILLNESS: 1 or more Elements   History from : Patient    Limitations to history : None    Jerzy Galeana is a 33 y.o. male who presents to the ER with chief complaint of suicidal ideation and depression.  Patient currently at [facility] secondary to methamphetamine use.  He has multiple previous suicide attempts.  In fact, he was at this facility on 4/8/2024 with multiple self-inflicted lacerations to bilateral forearms.  Patient is from [place].  His mother lives in [place].  She reports that she is his only support system.  He states that when he cut his arms several weeks ago he texted her and called her but did not get a response for at least 10 days.  This has increased his depression and SI      I have reviewed the nursing triage documentation and agree unless otherwise noted.  REVIEW OF SYSTEMS :    Review of Systems   Constitutional:  Negative for fatigue and fever.   Cardiovascular:  Negative for chest pain.   Gastrointestinal:  Negative for constipation.   Skin:  Negative for wound.     Positives and Pertinent negatives as per HPI.   SURGICAL HISTORY   No past surgical history on file.    CURRENTMEDICATIONS       Previous Medications    BUSPIRONE (BUSPAR) 5 MG TABLET    Take 1 tablet by mouth 3 times daily    IBUPROFEN (ADVIL;MOTRIN) 800 MG TABLET    Take 1 tablet by mouth every 6 hours as needed for Pain.    LINACLOTIDE (LINZESS PO)    Take by mouth
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
## 2024-04-26 NOTE — ED TRIAGE NOTES
Pt arrives from [facility] with c/o feeling suicidal, pt attempted April 8, 2024 by slitting his wrists and he was told to start talking to somebody before he gets to that point again. Pt feels alone and states his mom was his only person in his corner and now she isn't talking to him and he feels very lonely.

## 2024-04-26 NOTE — ED PROVIDER NOTES
Emergency Department Encounter  Location: [facility] EMERGENCY DEPARTMENT    Patient: Jerzy Galeana  MRN: [id number]  : 1990  Date of evaluation: 2024  ED Provider: Christian Cornejo MD    6:48 PM EDT  Jerzy Galeana was checked out to me by  Dr. West. Please see his/her initial documentation for details of the patient's initial ED presentation, physical exam and completed studies.    In brief, Jerzy Galeana is a 33 y.o. male that presented to the emergency department for suicidal ideation. The patient was placed in precautions, patient's clothing and belongings were removed, documented and stored in the emergency department.  Patient was reported to me to be medically stable. Mental health have evaluated the patient and have recommended that the patient be transferred to a inpatient psychiatric facility. We are currently awaiting placement for the patient.    I have reviewed and interpreted all of the currently available lab results and diagnostics from this visit:  Results for orders placed or performed during the hospital encounter of 24   CBC with Auto Differential   Result Value Ref Range    WBC 5.4 4.0 - 10.5 K/CU MM    RBC 4.02 (L) 4.6 - 6.2 M/CU MM    Hemoglobin 13.3 (L) 13.5 - 18.0 GM/DL    Hematocrit 39.3 (L) 42 - 52 %    MCV 97.8 78 - 100 FL    MCH 33.1 (H) 27 - 31 PG    MCHC 33.8 32.0 - 36.0 %    RDW 11.9 11.7 - 14.9 %    Platelets 271 140 - 440 K/CU MM    MPV 9.9 7.5 - 11.1 FL    Differential Type AUTOMATED DIFFERENTIAL     Neutrophils % 46.2 36 - 66 %    Lymphocytes % 38.8 24 - 44 %    Monocytes % 10.5 (H) 0 - 4 %    Eosinophils % 3.0 0 - 3 %    Basophils % 1.3 (H) 0 - 1 %    Segs Absolute 2.5 K/CU MM    Lymphocytes Absolute 2.1 K/CU MM    Monocytes Absolute 0.6 K/CU MM    Eosinophils Absolute 0.2 K/CU MM    Basophils Absolute 0.1 K/CU MM    Nucleated RBC % 0.0 %    Total Nucleated RBC 0.0 K/CU MM    Total Immature Neutrophil 0.01 K/CU MM    Immature
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
